# Patient Record
Sex: MALE | Race: BLACK OR AFRICAN AMERICAN | NOT HISPANIC OR LATINO
[De-identification: names, ages, dates, MRNs, and addresses within clinical notes are randomized per-mention and may not be internally consistent; named-entity substitution may affect disease eponyms.]

---

## 2021-07-17 ENCOUNTER — APPOINTMENT (OUTPATIENT)
Dept: DISASTER EMERGENCY | Facility: OTHER | Age: 21
End: 2021-07-17
Payer: COMMERCIAL

## 2021-07-17 PROCEDURE — 0001A: CPT

## 2021-08-07 ENCOUNTER — APPOINTMENT (OUTPATIENT)
Dept: DISASTER EMERGENCY | Facility: OTHER | Age: 21
End: 2021-08-07
Payer: COMMERCIAL

## 2021-08-07 PROCEDURE — 0002A: CPT

## 2023-08-18 ENCOUNTER — EMERGENCY (EMERGENCY)
Facility: HOSPITAL | Age: 23
LOS: 1 days | Discharge: HOME | End: 2023-08-18
Admitting: EMERGENCY MEDICINE
Payer: COMMERCIAL

## 2023-08-18 VITALS
RESPIRATION RATE: 16 BRPM | OXYGEN SATURATION: 99 % | DIASTOLIC BLOOD PRESSURE: 71 MMHG | SYSTOLIC BLOOD PRESSURE: 129 MMHG | TEMPERATURE: 98 F | HEART RATE: 59 BPM

## 2023-08-18 PROCEDURE — 99284 EMERGENCY DEPT VISIT MOD MDM: CPT

## 2023-08-18 RX ORDER — OFLOXACIN 0.3 %
2 DROPS OPHTHALMIC (EYE) ONCE
Refills: 0 | Status: COMPLETED | OUTPATIENT
Start: 2023-08-18 | End: 2023-08-18

## 2023-08-18 RX ORDER — ACETAMINOPHEN 500 MG
650 TABLET ORAL ONCE
Refills: 0 | Status: COMPLETED | OUTPATIENT
Start: 2023-08-18 | End: 2023-08-18

## 2023-08-18 RX ADMIN — Medication 2 DROP(S): at 09:44

## 2023-08-18 RX ADMIN — Medication 650 MILLIGRAM(S): at 10:09

## 2023-08-18 NOTE — ED PROVIDER NOTE - OBJECTIVE STATEMENT
22yM w/no stated pmhx presenting to the ED with right eye pain after being scratched by his cat last night. 22yM w/no stated pmhx presenting to the ED with right eye pain after being scratched by his cat last night. Pt states he was laying down next to his kitten when he was scratched in the right eye. Pt reports tearing, light sensitivity and foreign body sensation since the incident last night. Associated pt has mild headache. Pt denies contact lens use, changes to his vision, fever/chills, dizziness, pus drainage from the eye, eye swelling or any other concerns. 22yM w/no stated pmhx presenting to the ED with right eye pain after being scratched by his cat last night. Pt states he was laying down next to his kitten when he was scratched in the right eye. Pt reports tearing, light sensitivity and foreign body sensation since the incident last night. Associated pt has mild headache. Pt denies contact lens use, changes to his vision, fever/chills, dizziness, pus drainage from the eye, eye swelling or any other concerns.  Pt reports tetanus is up to date

## 2023-08-18 NOTE — ED PROVIDER NOTE - CLINICAL SUMMARY MEDICAL DECISION MAKING FREE TEXT BOX
22yM w/no stated pmhx PERRL, EOMs intact, no periorbital swelling, right eye with corneal abrasion at 9:00 position, sclera and conjunctiva clear, no Gary sign 22yM w/no stated pmhx presenting to the ER with right eye pain after being scratched by his cat last night. On exam pt is well appearing, right eye with tearing, PERRL, EOMs intact, no periorbital swelling, right eye with corneal abrasion at 9:00 position, sclera and conjunctiva clear, no Gary sign. Will treat with erythromycin and Ofloxacin. No hissing/biting from the cat. Cat is not vaccinated, given no bite, pt will observe cat for 10 days for signs of rabies. Pt to follow up with his PMD and in optho clinic.

## 2023-08-18 NOTE — ED PROVIDER NOTE - PATIENT PORTAL LINK FT
You can access the FollowMyHealth Patient Portal offered by [facility] by registering at the following website: [email or web address]. By joining [facility]’s FollowMyHealth portal, you will also be able to view your health information using other applications (apps) compatible with our system.

## 2023-08-18 NOTE — ED PROVIDER NOTE - NSFOLLOWUPINSTRUCTIONS_ED_ALL_ED_FT
Follow up with an ophthalmologist within 1 week, clinic information attached please call to make an appointment  Follow up with your primary care doctor within 1 week  Apply Ofloxacin eye drops 2 drops to right eye 4 times a day for 5 days  Apply Erythromycin ointment to lower eye lid 4 times a day for 5 days (last dose of the day before bed)  Monitor your cat for any illness or unusual activity  Return to the ER with any worsening or concerning symptoms, vision changes, worsening eye pain, pus drainage from eye, eye swelling or any other concerns.

## 2023-08-18 NOTE — ED ADULT NURSE NOTE - OBJECTIVE STATEMENT
Pt received to intake. Pt is AxO 3 and ambulatory. Pt c/o eye irritation, and tearing. Pt endorses being scratched in the right by his ca Pt received to intake. Pt is AxO 3 and ambulatory. Pt c/o eye irritation, and tearing. Pt endorses being scratched in the right by his cat. pt denies blurry vision, or difficulty seeing. Pt respirations are even and unlabored. Pt denies chest pain, SOB, headaches, dizziness, N/V/D, abdominal pain, or fever symptoms. Plan of care ongoing. Safety maintained.

## 2023-08-18 NOTE — ED ADULT NURSE NOTE - CAS EDN DISCHARGE INTERVENTIONS
Cone Biopsy     A cone-shaped piece of tissue is cut from the cervix. This removes the abnormal cells. The tissue that grows back is usually normal.    A cone biopsy is a quick outpatient surgery used to find and treat a problem in the cervix. Your healthcare provider may do a cone biopsy if 1 or more Pap tests and a colposcopy (microscope) exam showed abnormal cells on your cervix. A cone biopsy takes less than an hour. You’ll be able to go home the same day.   Preparing for a cone biopsy  Follow any directions you are given for not eating or drinking before surgery. You’ll also need to have an adult friend or family member drive you home after the procedure. On the day of surgery, be sure to arrive at the hospital or surgery center in time to sign in and get ready for your procedure.  Your surgery  Here is what to expect during surgery:  · You’ll be given anesthesia before your biopsy to keep you comfortable during surgery.  · Your healthcare provider then puts a thin metal tool (speculum) into your vagina to spread it open. This allows your health care provider to see your cervix.  · Then a cone-shaped piece of tissue is removed from the cervix. The tissue is cut from the opening up into the canal. This may be done with a small knife or with a laser or with the LEEP wire.  · A special cream may be put on your cervix to control bleeding.   · The tissue that is removed is then sent to the lab. The lab studies the tissue and makes sure the abnormal cells have been cut away. New tissue grows back in the cervix in 4 to 6 weeks.  Recovery from a cone biopsy  After the procedure, you may have:  · A pink, liquid discharge, light vaginal bleeding, less than a period  · Mild cramps  · A dark-colored discharge (from the cream used)  Don’t use tampons, don’t douche, and don’t have sex for 4 weeks  Do not do strenuous activity or lift greater than 10 lbs for 7 days.  There is a surgicell pack placed in your cervix.  It may  fall out and look like dark brown gelatin.  This is ok.  It may also stay in place and absorb on its own.    When to call your healthcare provider  Call your healthcare provider if you have:  · Heavy bleeding, or bleeding with clots  · Fever over 100.4°F (38°C) or higher, or as directed by your provider  · Chills  · Bad-smelling vaginal discharge  Risks and complications  Your healthcare provider will discuss the risks and possible complications of cone biopsy with you. These include:  · Not removing all of the abnormal tissue  · Severe bleeding  · Infection  · Weakening or scarring of the cervix that could lead to  birth   Date Last Reviewed: 2017-2018 [employer]. [address] [place] [address], [place] [address]. All rights reserved. This information is not intended as a substitute for professional medical care. Always follow your healthcare professional's instructions.    Acetaminophen Alert-Adult  You received 1,000mg of tylenol in surgery today.    The safe maximum amount of acetaminophen (Tylenol) for an adult is 4,000 mg. a day. Acetaminophen is often combined with over the counter and prescription medications.Taking too much acetaminophen can harm your liver. It is important to read medication labels to know how much acetaminophen is in each dose of these medications. Below are examples and is not a complete list.     Examples of over the counter medications that contain acetaminophen are:  Actifed Cold & Allergy  Benadryl Allergy & Cold   Coricidin Cold & Flu  Excedrin Migraine   Midol Complete  Nyquil Cold & Flu   Sudafed Cold & Cough  Tylenol PM  All of these contain 325-500 mg acetaminophen per caplet/tablet      Examples of prescription medications that contain acetaminophen are:  Darvocet, Norco, Vicoden: hydrocodone and acetaminophen- 325 mg per tablet  Percocet:  oxycodone and acetaminophen- 325 mg per tablet  Tylenol #3: codeine and acetaminophen- 300 mg per  tablet    Acetaminophen alone  Regular- 325 mg.  If you take 2, then dose is 650 mg  Extra Strength- 500 mg.  If you take 2, then dose is 1,000 mg    Please consider how much acetaminophen you are taking a day from various sources. Again, 4,000 mg a day is the maximum safe amount for an adult.         no IV

## 2023-08-18 NOTE — ED PROVIDER NOTE - PHYSICAL EXAMINATION
[Well Developed] : well developed [Normal Sclera/Conjunctiva] : normal sclera/conjunctiva [Clear to Auscultation] : lungs were clear to auscultation bilaterally [Normal Rate] : normal rate  [Regular Rhythm] : with a regular rhythm [Normal S1, S2] : normal S1 and S2 [No Murmur] : no murmur heard PERRL, EOMs intact, no periorbital swelling, right eye with corneal abrasion at 9:00 position, sclera and conjunctiva clear, no Gary sign

## 2023-08-18 NOTE — ED PROVIDER NOTE - PROGRESS NOTE DETAILS
EDITH Gonzalez: Spoke with optho, no added abx necessary, will cover with ofloxacin and erythromycin ointment. Pt to follow up in optho clinic. He will return to the ER with any worsening or concerning symptoms.

## 2023-08-18 NOTE — ED ADULT TRIAGE NOTE - CHIEF COMPLAINT QUOTE
pt c/o cat scratch to right eye from last night 11pm. eye is irritated, tearing. pt denies vision changes, medical hx.